# Patient Record
(demographics unavailable — no encounter records)

---

## 2025-02-28 NOTE — HISTORY OF PRESENT ILLNESS
[de-identified] : 32 F with obesity presenting for a CPE. She recently tested positive for CMV however she does not know if it is an active infection or a past infections. Denies any symptoms. She is trying to conceive. Reporting snoring at night.

## 2025-02-28 NOTE — COUNSELING
[Potential consequences of obesity discussed] : Potential consequences of obesity discussed [Encouraged to increase physical activity] : Encouraged to increase physical activity [FreeTextEntry4] : 20

## 2025-02-28 NOTE — PLAN
[FreeTextEntry1] :  #HCM - CBC with differential, HIV, Hep C, CMP, hemoglobin A1c, vitamin B12, vitamin D, urine analysis, lipid panel, TFTs all ordered. Patient to be informed of results. - The patient has screened negative for depression and excessive alcohol use. - The [place] Preventive Service Task Force recommends yearly lung cancer screening with LDCT for people who have a 20 pack-year or more smoking history and smoke now or have quit within the past 15 years and are between 50 and 80 years old. - Blood pressure as taken in the office today is within normal limits (<120/<80). - EKG performed in the office today is within normal limits. - Counseled on maintaining a healthy body weight. It has been estimated that up to one-third of cardiovascular disease deaths may be preventable by healthy diet and physical activity.

## 2025-02-28 NOTE — HEALTH RISK ASSESSMENT
[Intercurrent ED visits] : went to ED [No] : In the past 12 months have you used drugs other than those required for medical reasons? No [No falls in past year] : Patient reported no falls in the past year [Little interest or pleasure doing things] : 1) Little interest or pleasure doing things [0] : 1) Little interest or pleasure doing things: Not at all (0) [Feeling down, depressed, or hopeless] : 2) Feeling down, depressed, or hopeless [1] : 2) Feeling down, depressed, or hopeless for several days (1) [PHQ-2 Negative - No further assessment needed] : PHQ-2 Negative - No further assessment needed [Patient reported PAP Smear was normal] : Patient reported PAP Smear was normal [HIV Test offered] : HIV Test offered [Hepatitis C test offered] : Hepatitis C test offered [Significant Other] : lives with significant other [Sexually Active] : sexually active [Feels Safe at Home] : Feels safe at home [Fully functional (bathing, dressing, toileting, transferring, walking, feeding)] : Fully functional (bathing, dressing, toileting, transferring, walking, feeding) [Fully functional (using the telephone, shopping, preparing meals, housekeeping, doing laundry, using] : Fully functional and needs no help or supervision to perform IADLs (using the telephone, shopping, preparing meals, housekeeping, doing laundry, using transportation, managing medications and managing finances) [Reports normal functional visual acuity (ie: able to read med bottle)] : Reports normal functional visual acuity [Smoke Detector] : smoke detector [Carbon Monoxide Detector] : carbon monoxide detector [Safety elements used in home] : safety elements used in home [Seat Belt] :  uses seat belt [Sunscreen] : uses sunscreen [Former] : Former [0-4] : 0-4 [< 15 Years] : < 15 Years [NO] : No [FreeTextEntry1] : physical, pt wants to be educated more on CMV antibodies [de-identified] : RICKY [de-identified] : ED visit this month for miscarriage [de-identified] : pt is not active [de-identified] : poor [[id number]] : 1 [High Risk Behavior] : no high risk behavior [Reports changes in hearing] : Reports no changes in hearing [Reports changes in vision] : Reports no changes in vision [Reports changes in dental health] : Reports no changes in dental health [Travel to Developing Areas] : does not  travel to developing areas [TB Exposure] : is not being exposed to tuberculosis [Caregiver Concerns] : does not have caregiver concerns [PapSmearDate] : 01/24 [de-identified] : pt smoked on and off for 9 years cigars twice a month

## 2025-02-28 NOTE — HEALTH RISK ASSESSMENT
[Intercurrent ED visits] : went to ED [No] : In the past 12 months have you used drugs other than those required for medical reasons? No [No falls in past year] : Patient reported no falls in the past year [Little interest or pleasure doing things] : 1) Little interest or pleasure doing things [0] : 1) Little interest or pleasure doing things: Not at all (0) [Feeling down, depressed, or hopeless] : 2) Feeling down, depressed, or hopeless [1] : 2) Feeling down, depressed, or hopeless for several days (1) [PHQ-2 Negative - No further assessment needed] : PHQ-2 Negative - No further assessment needed [Patient reported PAP Smear was normal] : Patient reported PAP Smear was normal [HIV Test offered] : HIV Test offered [Hepatitis C test offered] : Hepatitis C test offered [Significant Other] : lives with significant other [Sexually Active] : sexually active [Feels Safe at Home] : Feels safe at home [Fully functional (bathing, dressing, toileting, transferring, walking, feeding)] : Fully functional (bathing, dressing, toileting, transferring, walking, feeding) [Fully functional (using the telephone, shopping, preparing meals, housekeeping, doing laundry, using] : Fully functional and needs no help or supervision to perform IADLs (using the telephone, shopping, preparing meals, housekeeping, doing laundry, using transportation, managing medications and managing finances) [Reports normal functional visual acuity (ie: able to read med bottle)] : Reports normal functional visual acuity [Smoke Detector] : smoke detector [Carbon Monoxide Detector] : carbon monoxide detector [Safety elements used in home] : safety elements used in home [Seat Belt] :  uses seat belt [Sunscreen] : uses sunscreen [Former] : Former [0-4] : 0-4 [< 15 Years] : < 15 Years [NO] : No [FreeTextEntry1] : physical, pt wants to be educated more on CMV antibodies [de-identified] : ED visit this month for miscarriage [de-identified] : RICKY [de-identified] : pt is not active [de-identified] : poor [[id number]] : 1 [High Risk Behavior] : no high risk behavior [Reports changes in hearing] : Reports no changes in hearing [Reports changes in vision] : Reports no changes in vision [Reports changes in dental health] : Reports no changes in dental health [Travel to Developing Areas] : does not  travel to developing areas [TB Exposure] : is not being exposed to tuberculosis [Caregiver Concerns] : does not have caregiver concerns [PapSmearDate] : 01/24 [de-identified] : pt smoked on and off for 9 years cigars twice a month

## 2025-02-28 NOTE — HISTORY OF PRESENT ILLNESS
[de-identified] : 32 F with obesity presenting for a CPE. She recently tested positive for CMV however she does not know if it is an active infection or a past infections. Denies any symptoms. She is trying to conceive. Reporting snoring at night.

## 2025-03-03 NOTE — HISTORY OF PRESENT ILLNESS
[FreeTextEntry1] : 31 yo F presents for initial evaluation of sleep disordered breathing. Referred by Dr. Asher Wan  PMH: asthma- controlled with Symbicort 80/4.5 mg BID, albuterol prn, herniated disc s/p microdiscectomy  Asthma triggers - cold air, exercise, pet dander, strong odors States her asthma last flared in January around the time she experienced a miscarriage at 17 weeks. She had run out of Symbicort and was using Albuterol 2-3 times a day for symptoms of SOB and wheezing.  Was not treated with steroids at that time.   Sleep history:  Main complaints of nonrestorative sleep, snoring and daytime somnolence. Wakes 1-2 times to urinate Refreshed upon awakening.  Morning headaches: denies Dry mouth/throat: denies Weight trend: not as physically active since COVID, gradual weight gain from ages 21-30 Denies drowsy driving, denies any accidents or near accidents.  ESS of 6 Also endorses chronic nasal congestion - depending on what side/position she sleeps, her one nostril is typically obstructed. Has not been evaluated by ENT for this.   Quality Metrics: Smoking history: smoked marijuana and cigars from ages 17 to 27, currently not smoking or vaping ESS: 6  Vaccines:  COVID: received 12/11/2024 Influenza: received 12/11/24 Pneumococcal: NA

## 2025-03-03 NOTE — REVIEW OF SYSTEMS
[EDS: ESS=____] : daytime somnolence: ESS=[unfilled] [Recent Wt Gain (___ Lbs)] : recent [unfilled] ~Ulb weight gain [Nasal Congestion] : nasal congestion

## 2025-03-03 NOTE — ASSESSMENT
[FreeTextEntry1] : 33 yo F with a h/o asthma, obesity who presents for initial evaluation of sleep disordered breathing.  Plan: 1. YAMIL - High suspicion for sleep disordered breathing given history and physical exam. Will send the patient for an HST at the [facility] sleep disorders center to evaluate for the presence of sleep disordered breathing. Explained the rationale for diagnosis and treatment of sleep apnea including its effect on quality of life and long-term cardiovascular risk. Counseled on the importance of weight loss and its positive impact on the severity of sleep apnea. Above discussed at length, all questions answered, she appears to understand. Follow up televisit 1 week after study completion to discuss results and next steps in management.    2. Chronic nasal congestion- ENT referral provided  3. Asthma - by history, mild persistent and currently controlled.  Full PFTs  continue with Budesonide/Formoterol 80/4.5 mg bid and albuterol prn Counseled on trigger avoidance

## 2025-03-03 NOTE — PHYSICAL EXAM
[General Appearance - Well Developed] : well developed [Normal Appearance] : normal appearance [General Appearance - Well Nourished] : well nourished [General Appearance - In No Acute Distress] : no acute distress [Normal Conjunctiva] : the conjunctiva exhibited no abnormalities [Eyelids - No Xanthelasma] : the eyelids demonstrated no xanthelasmas [Low Lying Soft Palate] : low lying soft palate [Enlarged Base of the Tongue] : enlargement of the base of the tongue [III] : III [Neck Appearance] : the appearance of the neck was normal [Heart Rate And Rhythm] : heart rate was normal and rhythm regular [Heart Sounds] : normal S1 and S2 [Murmurs] : no murmurs [] : no respiratory distress [Respiration, Rhythm And Depth] : normal respiratory rhythm and effort [Exaggerated Use Of Accessory Muscles For Inspiration] : no accessory muscle use [Auscultation Breath Sounds / Voice Sounds] : lungs were clear to auscultation bilaterally [Motor Tone] : muscle strength and tone were normal [Nail Clubbing] : no clubbing of the fingernails [Cyanosis, Localized] : no localized cyanosis [Skin Color & Pigmentation] : normal skin color and pigmentation [Cranial Nerves] : cranial nerves 2-12 were intact [Oriented To Time, Place, And Person] : oriented to person, place, and time [Impaired Insight] : insight and judgment were intact [Affect] : the affect was normal [Mood] : the mood was normal

## 2025-03-03 NOTE — CONSULT LETTER
[Dear  ___] : Dear  [unfilled], [Consult Letter:] : I had the pleasure of evaluating your patient, [unfilled]. [( Thank you for referring [unfilled] for consultation for _____ )] : Thank you for referring [unfilled] for consultation for [unfilled] [Please see my note below.] : Please see my note below. [Consult Closing:] : Thank you very much for allowing me to participate in the care of this patient.  If you have any questions, please do not hesitate to contact me. [Sincerely,] : Sincerely, [FreeTextEntry2] :  Dr. Asher Wan  [FreeTextEntry3] : Chelsey Beaver MD, FAASM  of Medicine Associate , Fellowship in Pulmonary and Critical Care Medicine Division of Pulmonary, Critical Care & Sleep Medicine [facility] [employer] [facility] [employer] at [facility]

## 2025-03-03 NOTE — ASSESSMENT
[FreeTextEntry1] : 33 yo F with a h/o asthma, obesity who presents for initial evaluation of sleep disordered breathing.  Plan: 1. YAMIL - High suspicion for sleep disordered breathing given history and physical exam. Will send the patient for an HST at the [facility] sleep disorders center to evaluate for the presence of sleep disordered breathing. Explained the rationale for diagnosis and treatment of sleep apnea including its effect on quality of life and long-term cardiovascular risk. Counseled on the importance of weight loss and its positive impact on the severity of sleep apnea. Above discussed at length, all questions answered, she appears to understand. Follow up televisit 1 week after study completion to discuss results and next steps in management.    2. Chronic nasal congestion- ENT referral provided  3. Asthma - by history, mild persistent and currently controlled.  Full PFTs  continue with Budesonide/Formoterol 80/4.5 mg bid and albuterol prn Counseled on trigger avoidance

## 2025-03-03 NOTE — CONSULT LETTER
[Dear  ___] : Dear  [unfilled], [Consult Letter:] : I had the pleasure of evaluating your patient, [unfilled]. [( Thank you for referring [unfilled] for consultation for _____ )] : Thank you for referring [unfilled] for consultation for [unfilled] [Please see my note below.] : Please see my note below. [Consult Closing:] : Thank you very much for allowing me to participate in the care of this patient.  If you have any questions, please do not hesitate to contact me. [Sincerely,] : Sincerely, [FreeTextEntry2] :  Dr. Asher Wan  [FreeTextEntry3] : Chelsey Beaver MD, FAASM  of Medicine Associate , Fellowship in Pulmonary and Critical Care Medicine Division of Pulmonary, Critical Care & Sleep Medicine [facility] [employer] at [facility]

## 2025-03-13 NOTE — END OF VISIT
[FreeTextEntry3] : I personally saw and examined Ms. MIKE THORNTON in detail this visit today. I personally reviewed the HPI, PMH, FH, SH, ROS and medications/allergies. I have spoken to NAOMIE Walker regarding the history and have personally determined the assessment and plan of care, and documented this myself. I was present and participated in all key portions of the encounter and all procedures noted above. I have made changes in the body of the note where appropriate.   Attesting Faculty: See Attending Signature Below

## 2025-03-13 NOTE — HISTORY OF PRESENT ILLNESS
[de-identified] : 33yo female with nasal congestion, alternates sides for many years. She is followed by pulm and is getting a sleep study done. She has tried nasal saline rinses but the congestion returns. She has no underlying allergies. She has just clear secretions dripping from her nose. She denies any purulent secretions or sinus pressure

## 2025-03-13 NOTE — ASSESSMENT
[FreeTextEntry1] : 33yo female with nasal congestion that alternates sides for many years along with clear nasal secretions and snoring  Nasal Endoscopy shows Left DNS and bilateral hypertrophic turbinates L>R -Recommend nasal saline rinses 1-2x/day -Rx: Flonase one spray BID for 4-6 weeks -Rx: Azelastine one spray BID for 4-6 weeks -Has a sleep study scheduled to r/o YAMIL -F/u in 2-3 months, if symptoms do not improve can consider septoplasty and turbinate reduction although discussed that YAMIL is typically multifactorial (anatomy/weight)

## 2025-03-13 NOTE — PHYSICAL EXAM
[Nasal Endoscopy Performed] : nasal endoscopy was performed, see procedure section for findings [] : septum deviated to the left [Normal] : mucosa is normal [Midline] : trachea located in midline position [de-identified] : hypertrophic L>R

## 2025-03-13 NOTE — PROCEDURE
[de-identified] : Dr. bellamy [de-identified] : Reason for nasal endoscopy: anterior rhinoscopy insufficient to account for symptoms.  Flexible scope #2 was used. Right nasal passage with hypertrophic inferior, middle and superior turbinates. Nasal passage patent with clear middle meatus and sphenoethmoid recess. Left DNS. Left nasal passage with hypertrophic inferior, middle and superior turbinates. Nasal passage was patent with clear middle meatus and sphenoethmoid recess. No mucopurulence or polyps appreciated. Nasopharynx clear.

## 2025-03-13 NOTE — CONSULT LETTER
[Dear  ___] : Dear  [unfilled], [Consult Letter:] : I had the pleasure of evaluating your patient, [unfilled]. [Please see my note below.] : Please see my note below. [Consult Closing:] : Thank you very much for allowing me to participate in the care of this patient.  If you have any questions, please do not hesitate to contact me. [Sincerely,] : Sincerely, [FreeTextEntry3] : Mahsa Ribeiro MD Otolaryngology and Cranial Base Surgery Attending Physician - Department of Otolaryngology and Head & Neck Surgery [facility]  [employer] at [facility]

## 2025-03-16 NOTE — HISTORY OF PRESENT ILLNESS
[FreeTextEntry1] : MIKE THORNTON ,31 YO,  Presents for Fertility discussion Patient was having unprotected sex for 3 years prior to conception.  Menarche: 12yoa  Period every 30 days (missed period 4 times entire life since menarche)  TVUS 2024 wnl   OBHX: SAB X 1 @17 weeks (2025)  IUFD at 17wks 2/2 PPROM  Surgical pathology with acute chorioamnionitis  Parental genome choro microarray CMV IgG antibody positive  msafp wnl Panorama low risk  Obesity BMI 43.26  APLS work up noted on patient's phone wnl   GYNHX: Fibroid on the cervix LMP:2024       Irregular Menses Sexually active. Not using any contraceptive's Last pap was on 2024

## 2025-03-16 NOTE — PLAN
[FreeTextEntry1] : Fertility work up  PCOS labs today  Referral for pelvic sonogram  Advised patient to try to lose weight prior to trying to conceive to decrease the risks that come with obesity in pregnancy  Diet and exercise counseling: Advised patient to decrease the amount of complex carbs and sodium consumed per day. Advised decrease by 500 calories per day. Advised 150 minutes of moderate exercise per week. Follow up via telehealth visit in 10-14 days

## 2025-03-16 NOTE — HISTORY OF PRESENT ILLNESS
[FreeTextEntry1] : MIKE THORNTON ,33 YO,  Presents for Fertility discussion Patient was having unprotected sex for 3 years prior to conception.  Menarche: 12yoa  Period every 30 days (missed period 4 times entire life since menarche)  TVUS 2024 wnl   OBHX: SAB X 1 @17 weeks (2025)  IUFD at 17wks 2/2 PPROM  Surgical pathology with acute chorioamnionitis  Parental genome choro microarray CMV IgG antibody positive  msafp wnl Panorama low risk  Obesity BMI 43.26  APLS work up noted on patient's phone wnl   GYNHX: Fibroid on the cervix LMP:2024       Irregular Menses Sexually active. Not using any contraceptive's Last pap was on 2024

## 2025-04-15 NOTE — CONSULT LETTER
[Dear  ___] : Dear  [unfilled], [Courtesy Letter:] : I had the pleasure of seeing your patient, [unfilled], in my office today. [Please see my note below.] : Please see my note below. [Consult Closing:] : Thank you very much for allowing me to participate in the care of this patient.  If you have any questions, please do not hesitate to contact me. [Sincerely,] : Sincerely, [FreeTextEntry2] :  Dr. Asher Wan  [FreeTextEntry3] : Chelsey Beaver MD, FAASM  of Medicine Associate , Fellowship in Pulmonary and Critical Care Medicine Division of Pulmonary, Critical Care & Sleep Medicine [facility] [employer] at [facility]

## 2025-04-15 NOTE — REASON FOR VISIT
[Home] : at home, [unfilled] , at the time of the visit. [Medical Office: ([facility])___] : at the medical office located in  [Telehealth (audio & video)] : This visit was provided via telehealth using real-time 2-way audio visual technology. [Verbal consent obtained from patient] : the patient, [unfilled] [Follow-Up] : a follow-up visit [Sleep Apnea] : sleep apnea

## 2025-04-15 NOTE — ASSESSMENT
[FreeTextEntry1] : 33 yo F with a h/o asthma, obesity who presents for follow up.  Plan: Asthma - moderate persistent.  c/w Symbicort 80/4.5 mg BID, Albuterol prn Discussed initiating Singulair- will consider  Trigger avoidance was discussed at length (difficult as she has a dog and 2 cats) ENT follow up for DNS and chronic nasal congestion

## 2025-04-15 NOTE — HISTORY OF PRESENT ILLNESS
[FreeTextEntry1] : 31 yo F with moderate persistent asthma, chronic nasal congestion, obesity who presents for follow up.  3/3/25: Initial evaluation of sleep disordered breathing and asthma. Referred by Dr. Asher Wan  PMH: asthma- controlled with Symbicort 80/4.5 mg BID, albuterol prn, herniated disc s/p microdiscectomy  Asthma triggers - cold air, exercise, pet dander, strong odors States her asthma last flared in January around the time she experienced a miscarriage at 17 weeks. She had run out of Symbicort and was using Albuterol 2-3 times a day for symptoms of SOB and wheezing.  Was not treated with steroids at that time.   Sleep history:  Main complaints of nonrestorative sleep, snoring and daytime somnolence. Wakes 1-2 times to urinate Refreshed upon awakening.  Morning headaches: denies Dry mouth/throat: denies Weight trend: not as physically active since COVID, gradual weight gain from ages 21-30 Denies drowsy driving, denies any accidents or near accidents.  ESS of 6 Also endorses chronic nasal congestion - depending on what side/position she sleeps, her one nostril is typically obstructed. Has not been evaluated by ENT for this.   Quality Metrics: Smoking history: smoked marijuana and cigars from ages 17 to 27, currently not smoking or vaping ESS: 6  Vaccines:  COVID: received 12/11/2024 Influenza: received 12/11/24 Pneumococcal: NA  Follow up Televisit 4/15/25: HST 4/7/25: no e/o YAMIL, AHI 1.8/hr, marium SpO2 89% States the night of the study was a typical night of sleep. Was seen by Dr. Ribeiro - diagnosed with DNS, started on nasal saline rinses, Flonase, and Azelastine which she states is helping somewhat (however currently has a URI)  PFTs: moderate obstructive defect with significant BDR, normal lung volumes and DLCO.  Labs: no eos, IgE 117, allergen panel positive cat, dog, j luis grass, mouse urine Has a dog and 2 cats- does note that if she is exerting herself at home and there is dog/cat hair around Remains on Symbicort 80/4.5 mg BID, Albuterol prn Has not been on Singulair for years

## 2025-04-15 NOTE — REVIEW OF SYSTEMS
[EDS: ESS=____] : daytime somnolence: ESS=[unfilled] [Recent Wt Gain (___ Lbs)] : recent [unfilled] ~Ulb weight gain [Nasal Congestion] : nasal congestion [Snoring] : snoring [FreeTextEntry8] : per hpi

## 2025-05-05 NOTE — PROCEDURE
[de-identified] : Dr. bellamy [de-identified] : Reason for nasal endoscopy: anterior rhinoscopy insufficient to account for symptoms.  Flexible scope #2 was used. Right nasal passage with less hypertrophic inferior, middle and superior turbinates. Nasal passage patent with clear middle meatus and sphenoethmoid recess. Left DNS. Left nasal passage with less hypertrophic inferior, middle and superior turbinates. Nasal passage was patent with clear middle meatus and sphenoethmoid recess. No mucopurulence or polyps appreciated. Nasopharynx clear.

## 2025-05-05 NOTE — ASSESSMENT
[FreeTextEntry1] : 31yo female who is here for her nasal congestion and snoring. She is feeling better on the nasal regimen and her sleep study was normal  Nasal Endoscopy shows Left DNS and bilateral hypertrophic turbinates L>R looks better on the nasal regimen and she notes her nasal congestion has resolved -Recommend nasal saline rinses 1-2x/day -continue Flonase one spray BID when feels congested -continue Azelastine one spray BID when feels congested  -F/u as needed

## 2025-05-05 NOTE — HISTORY OF PRESENT ILLNESS
[de-identified] : 33yo female with nasal congestion, alternates sides for many years. She is followed by pulm and is getting a sleep study done. She has tried nasal saline rinses but the congestion returns. She has no underlying allergies. She has just clear secretions dripping from her nose. She denies any purulent secretions or sinus pressure  [FreeTextEntry1] : She is here for f/u. She states the nasal regimen is helping with the nasal congestion. She has no congestion today. She states she will feel congested in the morning but once she uses the sprays it improves. Her sleep study was negative.

## 2025-05-05 NOTE — PHYSICAL EXAM
[Nasal Endoscopy Performed] : nasal endoscopy was performed, see procedure section for findings [] : septum deviated to the left [Normal] : mucosa is normal [Midline] : trachea located in midline position [de-identified] : hypertrophy improved

## 2025-05-22 NOTE — HISTORY OF PRESENT ILLNESS
[de-identified] : 32 F with obesity presenting for a follow up visit. It has been difficult for her to make lifestyle changes. She is following with GYN and a nutritionist.

## 2025-05-22 NOTE — COUNSELING
[Potential consequences of obesity discussed] : Potential consequences of obesity discussed [Encouraged to increase physical activity] : Encouraged to increase physical activity [Needs reinforcement, provided] : Patient needs reinforcement on understanding of disease, goals and obesity follow-up plan; reinforcement was provided [FreeTextEntry4] : 20

## 2025-07-30 NOTE — HISTORY OF PRESENT ILLNESS
[FreeTextEntry1] : pt here for a follow up. Also wants to discuss weight loss medications  [de-identified] : 32 F with obesity presenting for a follow up visit. She recently saw her OB/GYN who recommended that she start on thyroid replacement for unclear reasons.  Prior blood test results reviewed and noted to have a normal TSH and T4.  She has been taking levothyroxine for the past 2 months without any significant weight loss.  She was also told she was prediabetic and advised to consider metformin.  She is currently seeing a nutritionist for weight loss.  She admits to not focusing entirely on trying to lose weight via lifestyle changes.

## 2025-07-30 NOTE — HISTORY OF PRESENT ILLNESS
[FreeTextEntry1] : pt here for a follow up. Also wants to discuss weight loss medications  [de-identified] : 32 F with obesity presenting for a follow up visit. She recently saw her OB/GYN who recommended that she start on thyroid replacement for unclear reasons.  Prior blood test results reviewed and noted to have a normal TSH and T4.  She has been taking levothyroxine for the past 2 months without any significant weight loss.  She was also told she was prediabetic and advised to consider metformin.  She is currently seeing a nutritionist for weight loss.  She admits to not focusing entirely on trying to lose weight via lifestyle changes.